# Patient Record
Sex: FEMALE | Race: WHITE | NOT HISPANIC OR LATINO | Employment: FULL TIME | URBAN - METROPOLITAN AREA
[De-identification: names, ages, dates, MRNs, and addresses within clinical notes are randomized per-mention and may not be internally consistent; named-entity substitution may affect disease eponyms.]

---

## 2024-09-10 ENCOUNTER — HOSPITAL ENCOUNTER (EMERGENCY)
Facility: CLINIC | Age: 29
Discharge: HOME | End: 2024-09-11
Attending: FAMILY MEDICINE | Admitting: FAMILY MEDICINE
Payer: COMMERCIAL

## 2024-09-10 ENCOUNTER — APPOINTMENT (OUTPATIENT)
Dept: ULTRASOUND IMAGING | Facility: CLINIC | Age: 29
End: 2024-09-10
Attending: EMERGENCY MEDICINE
Payer: COMMERCIAL

## 2024-09-10 DIAGNOSIS — O03.9 MISCARRIAGE: ICD-10-CM

## 2024-09-10 LAB
ABO/RH(D): NORMAL
ANTIBODY SCREEN: NEGATIVE
BASOPHILS # BLD AUTO: 0.1 10E3/UL (ref 0–0.2)
BASOPHILS NFR BLD AUTO: 1 %
EOSINOPHIL # BLD AUTO: 0.4 10E3/UL (ref 0–0.7)
EOSINOPHIL NFR BLD AUTO: 4 %
ERYTHROCYTE [DISTWIDTH] IN BLOOD BY AUTOMATED COUNT: 12.8 % (ref 10–15)
HCT VFR BLD AUTO: 37.5 % (ref 35–47)
HGB BLD-MCNC: 13.1 G/DL (ref 11.7–15.7)
IMM GRANULOCYTES # BLD: 0 10E3/UL
IMM GRANULOCYTES NFR BLD: 0 %
LYMPHOCYTES # BLD AUTO: 3.6 10E3/UL (ref 0.8–5.3)
LYMPHOCYTES NFR BLD AUTO: 31 %
MCH RBC QN AUTO: 29.2 PG (ref 26.5–33)
MCHC RBC AUTO-ENTMCNC: 34.9 G/DL (ref 31.5–36.5)
MCV RBC AUTO: 84 FL (ref 78–100)
MONOCYTES # BLD AUTO: 0.8 10E3/UL (ref 0–1.3)
MONOCYTES NFR BLD AUTO: 7 %
NEUTROPHILS # BLD AUTO: 6.7 10E3/UL (ref 1.6–8.3)
NEUTROPHILS NFR BLD AUTO: 58 %
NRBC # BLD AUTO: 0 10E3/UL
NRBC BLD AUTO-RTO: 0 /100
PLATELET # BLD AUTO: 202 10E3/UL (ref 150–450)
RBC # BLD AUTO: 4.48 10E6/UL (ref 3.8–5.2)
SPECIMEN EXPIRATION DATE: NORMAL
WBC # BLD AUTO: 11.6 10E3/UL (ref 4–11)

## 2024-09-10 PROCEDURE — 86900 BLOOD TYPING SEROLOGIC ABO: CPT | Performed by: EMERGENCY MEDICINE

## 2024-09-10 PROCEDURE — 99284 EMERGENCY DEPT VISIT MOD MDM: CPT | Performed by: FAMILY MEDICINE

## 2024-09-10 PROCEDURE — 76801 OB US < 14 WKS SINGLE FETUS: CPT

## 2024-09-10 PROCEDURE — 85004 AUTOMATED DIFF WBC COUNT: CPT | Performed by: EMERGENCY MEDICINE

## 2024-09-10 PROCEDURE — 99285 EMERGENCY DEPT VISIT HI MDM: CPT | Mod: 25

## 2024-09-10 PROCEDURE — 36415 COLL VENOUS BLD VENIPUNCTURE: CPT | Performed by: EMERGENCY MEDICINE

## 2024-09-10 ASSESSMENT — ENCOUNTER SYMPTOMS
FEVER: 0
DIZZINESS: 0
LIGHT-HEADEDNESS: 0

## 2024-09-10 ASSESSMENT — COLUMBIA-SUICIDE SEVERITY RATING SCALE - C-SSRS
1. IN THE PAST MONTH, HAVE YOU WISHED YOU WERE DEAD OR WISHED YOU COULD GO TO SLEEP AND NOT WAKE UP?: NO
6. HAVE YOU EVER DONE ANYTHING, STARTED TO DO ANYTHING, OR PREPARED TO DO ANYTHING TO END YOUR LIFE?: NO
2. HAVE YOU ACTUALLY HAD ANY THOUGHTS OF KILLING YOURSELF IN THE PAST MONTH?: NO

## 2024-09-10 ASSESSMENT — ACTIVITIES OF DAILY LIVING (ADL)
ADLS_ACUITY_SCORE: 35
ADLS_ACUITY_SCORE: 35
ADLS_ACUITY_SCORE: 33

## 2024-09-11 VITALS
OXYGEN SATURATION: 97 % | RESPIRATION RATE: 20 BRPM | TEMPERATURE: 98 F | DIASTOLIC BLOOD PRESSURE: 69 MMHG | WEIGHT: 170 LBS | SYSTOLIC BLOOD PRESSURE: 109 MMHG | HEART RATE: 68 BPM

## 2024-09-11 NOTE — DISCHARGE INSTRUCTIONS
"You are in the process of having a miscarriage.  You still have products of conception in your uterus that will need to come out.  You may end up doing this on your own or perhaps need a D&C if you do not complete the miscarriage spontaneously.  You will have more bleeding and cramping as you go into a \"mini labor\".  If your bleeding is quite heavy and you become dizzy, lightheaded, pale or overall just not feeling well, please return to the ED.  We do not have anyone that can do a D&C at our facility tonight so if you need one, we would need to transfer you to [facility] where your primary OB/GYN practices.   either way, contact your primary provider in the morning for further instructions.  It is better than it was a pleasure visiting with both of you tonight.  I wish you the very best going forward and hope things settle down quickly for you.    Thank you for choosing [facility]. We appreciate the opportunity to meet your urgent medical needs. Please let us know if we could have done anything to make your stay more satisfying.    After discharge, please closely monitor for any new or worsening symptoms. Return to the Emergency Department if you develop any acute worsening signs or symptoms.    If you had lab work, cultures or imaging studies done during your stay, the final results may still be pending. We will call you if your plan of care needs to change. However, if you are not improving as expected, please follow up with your primary care provider or clinic.     Start any prescription medications that were prescribed to you and take them as directed.     Please see additional handouts that may be pertinent to your condition.      "

## 2024-09-11 NOTE — ED PROVIDER NOTES
History     Chief Complaint   Patient presents with    Vaginal Bleeding - Pregnant     HPI  Angelica Chaves is a 29 year old G2, P1 at 8 weeks 2 days gestation by dates.  First pregnancy was uncomplicated and resulted in a vaginal delivery after induction.  Her daughter is almost 2-1/2 years old now.  She started with a little bit of spotting yesterday after intercourse.  Bleeding worsened today and really escalated tonight.  She had called called her clinic and went in and had a serum hCG yesterday which was 15,000 671.  I reviewed that phone call and the lab results on care everywhere.  She has had some clots.  A little bit of discomfort but no real sharp cramping.  No dizziness or lightheadedness.  Otherwise in good health.  Did have gestational diabetes with her first pregnancy.  Here with her , Bryan.    Allergies:  No Known Allergies      Problem List:    There are no problems to display for this patient.       Past Medical History:    No past medical history on file.    Past Surgical History:    No past surgical history on file.    Family History:    No family history on file.    Social History:  Marital Status:   [2]        Medications:    No current outpatient medications on file.        Review of Systems   Constitutional:  Negative for fever.   Neurological:  Negative for dizziness and light-headedness.   All other systems reviewed and are negative.      Physical Exam   BP: 127/78  Pulse: 75  Temp: 98  F (36.7  C)  Resp: 18  Weight: 77.1 kg (170 lb)  SpO2: 96 %      Physical Exam  Constitutional:       General: She is not in acute distress.     Appearance: Normal appearance.   Pulmonary:      Effort: Pulmonary effort is normal. No respiratory distress.   Skin:     General: Skin is warm and dry.      Coloration: Skin is not pale.   Neurological:      Mental Status: She is alert.         ED Course        Procedures              Critical Care time:  none               Results for orders  placed or performed during the hospital encounter of 09/10/24 (from the past 24 hour(s))   ABO/Rh type and screen    Narrative    The following orders were created for panel order ABO/Rh type and screen.  Procedure                               Abnormality         Status                     ---------                               -----------         ------                     Adult Type and Screen[[id number]]                            Final result                 Please view results for these tests on the individual orders.   CBC with platelets differential    Narrative    The following orders were created for panel order CBC with platelets differential.  Procedure                               Abnormality         Status                     ---------                               -----------         ------                     CBC with platelets and d...[[id number]]  Abnormal            Final result                 Please view results for these tests on the individual orders.   Adult Type and Screen   Result Value Ref Range    ABO/RH(D) O POS     Antibody Screen Negative Negative    SPECIMEN EXPIRATION DATE [id number]    CBC with platelets and differential   Result Value Ref Range    WBC Count 11.6 (H) 4.0 - 11.0 10e3/uL    RBC Count 4.48 3.80 - 5.20 10e6/uL    Hemoglobin 13.1 11.7 - 15.7 g/dL    Hematocrit 37.5 35.0 - 47.0 %    MCV 84 78 - 100 fL    MCH 29.2 26.5 - 33.0 pg    MCHC 34.9 31.5 - 36.5 g/dL    RDW 12.8 10.0 - 15.0 %    Platelet Count 202 150 - 450 10e3/uL    % Neutrophils 58 %    % Lymphocytes 31 %    % Monocytes 7 %    % Eosinophils 4 %    % Basophils 1 %    % Immature Granulocytes 0 %    NRBCs per 100 WBC 0 <1 /100    Absolute Neutrophils 6.7 1.6 - 8.3 10e3/uL    Absolute Lymphocytes 3.6 0.8 - 5.3 10e3/uL    Absolute Monocytes 0.8 0.0 - 1.3 10e3/uL    Absolute Eosinophils 0.4 0.0 - 0.7 10e3/uL    Absolute Basophils 0.1 0.0 - 0.2 10e3/uL    Absolute Immature Granulocytes 0.0 <=0.4 10e3/uL    Absolute  NRBCs 0.0 10e3/uL   US OB < 14 Weeks w Transvaginal    Narrative    EXAM: US OB <14 WEEKS WITH TRANSVAGINAL SINGLE  LOCATION: [facility]  DATE: 9/10/2024    INDICATION: vaginal bleeding8 weeks pregnant  COMPARISON: None.  TECHNIQUE: Transabdominal scans were performed. Endovaginal ultrasound was performed to better visualize the embryo.    FINDINGS:  UTERUS: No intrauterine pregnancy identified. Thickened heterogeneous endometrium with internal vascularity concerning for retained products of conception. Findings may represent ongoing miscarriage    RIGHT OVARY: Right ovary measures 2.9 x 2.4 x 2.1 cm. Within the right ovary there is a 1.7 x 1.7 x 1.6 cm right corpus luteal cyst. No evidence for torsion based on color flow examination.    LEFT OVARY: Left ovary measures 2.0 x 1.2 x 1.6 cm. No evidence for torsion based on color flow examination.      Impression    IMPRESSION:   1.  No intrauterine pregnancy seen. Findings may represent ongoing miscarriage. Retained products of conception not excluded.    2.  Right corpus luteal cyst.    3.  No evidence for ovarian torsion.         Endometrial stripe is 1.3 cm per US Tech work sheet      Medications - No data to display    Assessments & Plan (with Medical Decision Making)  29-year-old G2, P1 at 8 weeks 2 days gestation by dates.  Has not had an ultrasound yet.  Started with a little bit of spotting yesterday after intercourse and bleeding worsened today.  No orthostatic changes.  Little discomfort but no sharp cramping.  Has been passing some clots tonight.  Serum hCG in clinic yesterday was over 15,000.  She was scheduled for an outpatient ultrasound tomorrow but the bleeding worsened so she presented to the ED tonight.  Hemoglobin 13.1.  Blood type O+.    Ultrasound shows no IUP.  Thickened heterogeneous endometrium of 1.3 cm with internal vascularity consistent with retained products of conception.  Findings may represent ongoing  miscarriage.  Unfortunately, the correct tube was not collected for serum hCG but is not likely to change anything tonight so I did not have them redraw her.  She is in the process of miscarrying.  May go on to spontaneously complete this or may need a D&C.  Time will tell.  Right now she is clinically stable.  I spoke with Dr. Allen who is on for C-section call but he does not do D&Cs.    She gets her OB care through [facility] with Dr. Felisa Edwards.  Call was placed to her group to discuss our plan going forward.   We paged several times but never heard back from the on-call physician.  Clinically, she is stable and feels like she can make it at home.  She realizes she is going to have heavier bleeding and likely cramping and passing more tissue as her uterus empties out.  If she becomes dizzy, lightheaded, pale or overall just not feeling well, she needs to return to the ED.  She is aware that she would need to be transferred to [facility] for D&C as I do not have anyone on staff tonight that could do that.  She and her  are both very appreciative of her care.  She knows she needs to follow-up in the clinic no matter what happens to be certain that she completes the miscarriage, her hCG goes back down to 0 and her uterus is empty.  We discussed reportable signs when to return.  Verbal written discharge instructions given.     I have reviewed the nursing notes.    I have reviewed the findings, diagnosis, plan and need for follow up with the patient.           Medical Decision Making  The patient's presentation was of moderate complexity (an undiagnosed new problem with uncertain prognosis).    The patient's evaluation involved:  review of external note(s) from 1 sources (see separate area of note for details)  review of 1 test result(s) ordered prior to this encounter (see separate area of note for details)  ordering and/or review of 3+ test(s) in this encounter (see separate area  of note for details)    The patient's management necessitated moderate risk (discussed expectant management versus D&C now versus later if she feels to complete the miscarriage on her own.).        There are no discharge medications for this patient.      Final diagnoses:   Miscarriage - in progress       9/10/2024   [facility] EMERGENCY DEPT       Jeremy Cam MD  09/11/24 0238

## 2024-10-06 ENCOUNTER — HEALTH MAINTENANCE LETTER (OUTPATIENT)
Age: 29
End: 2024-10-06